# Patient Record
Sex: MALE | Race: WHITE | URBAN - METROPOLITAN AREA
[De-identification: names, ages, dates, MRNs, and addresses within clinical notes are randomized per-mention and may not be internally consistent; named-entity substitution may affect disease eponyms.]

---

## 2019-08-22 ENCOUNTER — OFFICE VISIT (OUTPATIENT)
Dept: FAMILY MEDICINE | Facility: CLINIC | Age: 39
End: 2019-08-22
Payer: COMMERCIAL

## 2019-08-22 VITALS
RESPIRATION RATE: 16 BRPM | SYSTOLIC BLOOD PRESSURE: 102 MMHG | OXYGEN SATURATION: 93 % | HEART RATE: 56 BPM | DIASTOLIC BLOOD PRESSURE: 74 MMHG | TEMPERATURE: 97.4 F | WEIGHT: 255 LBS

## 2019-08-22 DIAGNOSIS — M62.838 MUSCLE SPASM: ICD-10-CM

## 2019-08-22 DIAGNOSIS — M54.6 ACUTE RIGHT-SIDED THORACIC BACK PAIN: Primary | ICD-10-CM

## 2019-08-22 PROCEDURE — 99213 OFFICE O/P EST LOW 20 MIN: CPT | Performed by: PHYSICIAN ASSISTANT

## 2019-08-22 RX ORDER — TESTOSTERONE CYPIONATE 200 MG/ML
INJECTION, SOLUTION INTRAMUSCULAR
COMMUNITY
Start: 2018-11-08

## 2019-08-22 RX ORDER — METHOCARBAMOL 500 MG/1
500-1000 TABLET, FILM COATED ORAL 3 TIMES DAILY PRN
Qty: 50 TABLET | Refills: 0 | Status: SHIPPED | OUTPATIENT
Start: 2019-08-22

## 2019-08-22 RX ORDER — SILDENAFIL CITRATE 20 MG/1
TABLET ORAL
COMMUNITY
Start: 2018-11-07

## 2019-08-22 NOTE — PATIENT INSTRUCTIONS
Start with ice (20 minutes) after the end of the day then move to heat (20 minutes). Take the Robaxin about 30-60 minutes prior to gentle stretching (use heat prior to gentle stretching).    Call if would like referral to physical therapy.

## 2019-08-22 NOTE — PROGRESS NOTES
Subjective     Refugio Ansari is a 39 year old male who presents to clinic today for the following health issues:    HPI   Back Pain       Duration: 3 days        Specific cause: lifting- a door (did not notice at the time he was lifting but then worsened throughout the day)    Description:   Location of pain: low right back  Character of pain: sharp, gnawing and cramping (spasm)  Pain radiation:none  New numbness or weakness in legs, not attributed to pain:  no     Intensity: moderate    History:   Pain interferes with job: YES  History of back problems: no prior back problems  Any previous MRI or X-rays: None  Sees a specialist for back pain:  No  Therapies tried without relief: acetaminophen (Tylenol), cold, heat and NSAIDs    Alleviating factors:   Improved by: Improving gradually    Precipitating factors:  Worsened by: Lifting, Bending and taking a deep breath, yawning painful in the back    There is no problem list on file for this patient.      Current Outpatient Medications   Medication     sildenafil (REVATIO) 20 MG tablet     testosterone cypionate (DEPOTESTOSTERONE) 200 MG/ML injection     No current facility-administered medications for this visit.        Allergies   Allergen Reactions     Penicillins        Reviewed and updated as needed this visit by Provider         Review of Systems   GENERAL:  No fevers      Objective    /74 (BP Location: Right arm, Patient Position: Chair, Cuff Size: Adult Large)   Pulse 56   Temp 97.4  F (36.3  C) (Oral)   Resp 16   Wt 115.7 kg (255 lb)   SpO2 93%   There is no height or weight on file to calculate BMI.  Physical Exam   GENERAL: No acute distress  HEENT: Normocephalic  EXTREMITIES: No midline tenderness over the thoracic or lumbar spine. Tenderness over the right thoracic paraspinous muscles.  Right lower extremity: 5/5 strength with flexion of the knee, 5/5 strength with extension of the knee, 5/5 strength with flexion of the hip, 5/5 strength with  dorsiflexion, 5/5 strength with plantar flexion. Straight leg raise negative. Patellar reflex 1+. Achilles reflex 1+.  Left lower extremity: 5/5 strength with flexion of the knee, 5/5 strength with extension of the knee, 5/5 strength with flexion of the hip, 5/5 strength with dorsiflexion, 5/5 strength with plantar flexion. Straight leg raise negative. Patellar reflex 1+. Achilles reflex 1+.  NEURO: Alert, non-focal        Assessment & Plan     1. Acute right-sided thoracic back pain  Patient's history and exam is consistent with acute right-sided back pain related to muscle spasm.  I outlined a conservative treatment plan for him.  Please see below.  If not improving we can consider referral to physical therapy.  At this time I feel no imaging is indicated given he has had no trauma.  - methocarbamol (ROBAXIN) 500 MG tablet; Take 1-2 tablets (500-1,000 mg) by mouth 3 times daily as needed for muscle spasms  Dispense: 50 tablet; Refill: 0    2. Muscle spasm  As noted above.  - methocarbamol (ROBAXIN) 500 MG tablet; Take 1-2 tablets (500-1,000 mg) by mouth 3 times daily as needed for muscle spasms  Dispense: 50 tablet; Refill: 0     Patient Instructions   Start with ice (20 minutes) after the end of the day then move to heat (20 minutes). Take the Robaxin about 30-60 minutes prior to gentle stretching (use heat prior to gentle stretching).    Call if would like referral to physical therapy.        Return in about 2 weeks (around 9/5/2019).    Kimberly Lazar PA-C  [facility] [place]